# Patient Record
Sex: FEMALE | Race: WHITE | Employment: STUDENT | URBAN - METROPOLITAN AREA
[De-identification: names, ages, dates, MRNs, and addresses within clinical notes are randomized per-mention and may not be internally consistent; named-entity substitution may affect disease eponyms.]

---

## 2019-12-14 ENCOUNTER — OFFICE VISIT (OUTPATIENT)
Dept: URGENT CARE | Facility: URGENT CARE | Age: 19
End: 2019-12-14
Payer: COMMERCIAL

## 2019-12-14 VITALS
HEART RATE: 125 BPM | HEIGHT: 63 IN | TEMPERATURE: 99.2 F | DIASTOLIC BLOOD PRESSURE: 79 MMHG | SYSTOLIC BLOOD PRESSURE: 124 MMHG | OXYGEN SATURATION: 98 % | BODY MASS INDEX: 21.26 KG/M2 | WEIGHT: 120 LBS

## 2019-12-14 DIAGNOSIS — R52 ACHES: ICD-10-CM

## 2019-12-14 DIAGNOSIS — R07.0 THROAT PAIN: ICD-10-CM

## 2019-12-14 DIAGNOSIS — J02.0 STREPTOCOCCAL PHARYNGITIS: Primary | ICD-10-CM

## 2019-12-14 LAB
DEPRECATED S PYO AG THROAT QL EIA: ABNORMAL
FLUAV+FLUBV AG SPEC QL: NEGATIVE
FLUAV+FLUBV AG SPEC QL: NEGATIVE
SPECIMEN SOURCE: ABNORMAL
SPECIMEN SOURCE: NORMAL

## 2019-12-14 PROCEDURE — 87804 INFLUENZA ASSAY W/OPTIC: CPT | Mod: 59 | Performed by: PHYSICIAN ASSISTANT

## 2019-12-14 PROCEDURE — 99202 OFFICE O/P NEW SF 15 MIN: CPT | Performed by: PHYSICIAN ASSISTANT

## 2019-12-14 PROCEDURE — 87880 STREP A ASSAY W/OPTIC: CPT | Performed by: INTERNAL MEDICINE

## 2019-12-14 RX ORDER — AMOXICILLIN 500 MG/1
500 CAPSULE ORAL 3 TIMES DAILY
Qty: 21 CAPSULE | Refills: 0 | Status: SHIPPED | OUTPATIENT
Start: 2019-12-14 | End: 2019-12-21

## 2019-12-14 RX ORDER — IBUPROFEN 200 MG
200 TABLET ORAL EVERY 4 HOURS PRN
COMMUNITY

## 2019-12-14 RX ORDER — IBUPROFEN 800 MG/1
800 TABLET, FILM COATED ORAL EVERY 8 HOURS PRN
Qty: 30 TABLET | Refills: 0 | Status: SHIPPED | OUTPATIENT
Start: 2019-12-14

## 2019-12-14 ASSESSMENT — ENCOUNTER SYMPTOMS
SORE THROAT: 1
MUSCULOSKELETAL NEGATIVE: 1
COUGH: 1
CHILLS: 1
CARDIOVASCULAR NEGATIVE: 1
NAUSEA: 1
FEVER: 1
DIARRHEA: 0
EYES NEGATIVE: 1
VOMITING: 0
FATIGUE: 1

## 2019-12-14 ASSESSMENT — MIFFLIN-ST. JEOR: SCORE: 1283.45

## 2019-12-14 NOTE — PROGRESS NOTES
"SUBJECTIVE:   Andreina Johnson is a 19 year old female presenting with a chief complaint of   Chief Complaint   Patient presents with     Urgent Care     Pt in clinic to have eval for sore throat, cough, aches, and sweats.     Sweats     Generalized Body Aches     Pharyngitis       She is a new patient of [facility].  Patient presents with 3 days of fever, chills, body aches, ST.  Had influenza vaccine.      URI Adult    Onset of symptoms was 3 day(s) ago.  Course of illness is worsening.    Severity moderate  Current and Associated symptoms: fever, chills, sweats, stuffy nose, cough - non-productive and sore throat  Treatment measures tried include Tylenol/Ibuprofen.  Predisposing factors include student.        Review of Systems   Constitutional: Positive for chills, fatigue and fever.   HENT: Positive for congestion and sore throat.    Eyes: Negative.    Respiratory: Positive for cough.    Cardiovascular: Negative.    Gastrointestinal: Positive for nausea. Negative for diarrhea and vomiting.   Musculoskeletal: Negative.    Skin: Negative.    All other systems reviewed and are negative.      No past medical history on file.  No family history on file.  Current Outpatient Medications   Medication Sig Dispense Refill     amoxicillin (AMOXIL) 500 MG capsule Take 1 capsule (500 mg) by mouth 3 times daily for 7 days 21 capsule 0     ibuprofen (ADVIL/MOTRIN) 200 MG tablet Take 200 mg by mouth every 4 hours as needed for mild pain       ibuprofen (ADVIL/MOTRIN) 800 MG tablet Take 1 tablet (800 mg) by mouth every 8 hours as needed for moderate pain 30 tablet 0     ISOtretinoin (ACCUTANE PO)        Social History     Tobacco Use     Smoking status: Never Smoker     Smokeless tobacco: Never Used   Substance Use Topics     Alcohol use: Not on file       OBJECTIVE  /79   Pulse 125   Temp 99.2  F (37.3  C) (Oral)   Ht 1.6 m (5' 3\")   Wt 54.4 kg (120 lb)   SpO2 98%   BMI 21.26 kg/m      Physical Exam  Vitals " signs and nursing note reviewed.   Constitutional:       Appearance: Normal appearance. She is normal weight.   HENT:      Head: Normocephalic and atraumatic.      Right Ear: Tympanic membrane, ear canal and external ear normal.      Left Ear: Tympanic membrane, ear canal and external ear normal.      Nose: Nose normal.      Mouth/Throat:      Mouth: Mucous membranes are moist.      Pharynx: Oropharyngeal exudate and posterior oropharyngeal erythema present.   Eyes:      Extraocular Movements: Extraocular movements intact.      Conjunctiva/sclera: Conjunctivae normal.   Neck:      Musculoskeletal: Normal range of motion.   Cardiovascular:      Rate and Rhythm: Normal rate and regular rhythm.      Pulses: Normal pulses.      Heart sounds: Normal heart sounds.   Pulmonary:      Effort: Pulmonary effort is normal.      Breath sounds: Normal breath sounds.   Skin:     General: Skin is warm and dry.      Capillary Refill: Capillary refill takes less than 2 seconds.      Findings: No rash.   Neurological:      General: No focal deficit present.      Mental Status: She is alert and oriented to person, place, and time.   Psychiatric:         Mood and Affect: Mood normal.         Behavior: Behavior normal.         Labs:  Results for orders placed or performed in visit on 12/14/19 (from the past 24 hour(s))   Strep, Rapid Screen   Result Value Ref Range    Specimen Description Throat     Rapid Strep A Screen (A)      POSITIVE: Group A Streptococcal antigen detected by immunoassay.   Influenza A/B antigen   Result Value Ref Range    Influenza A/B Agn Specimen Nasal     Influenza A Negative NEG^Negative    Influenza B Negative NEG^Negative       X-Ray was not done.    ASSESSMENT:      ICD-10-CM    1. Streptococcal pharyngitis J02.0 amoxicillin (AMOXIL) 500 MG capsule     ibuprofen (ADVIL/MOTRIN) 800 MG tablet   2. Throat pain R07.0 Strep, Rapid Screen   3. Aches R52 Influenza A/B antigen        Medical Decision  Making:    Differential Diagnosis:  URI Adult/Peds:  Influenza, Strep pharyngitis, Viral pharyngitis, Viral syndrome and Viral upper respiratory illness    Serious Comorbid Conditions:  Adult:  None    PLAN:    URI Adult:  Tylenol, Ibuprofen, Fluids and Rest  Amoxicillin 500 TID x 7 d and Ibuprofen 800 TID prn.    Followup:    If not improving or if condition worsens, follow up with your Primary Care Provider, If not improving or if conditions worsens over the next 12-24 hours, go to the Emergency Department    Patient Instructions     Patient Education     Pharyngitis: Strep (Confirmed)    You have had a positive test for strep throat. Strep throat is a contagious illness. It is spread by coughing, kissing or by touching others after touching your mouth or nose. Symptoms include throat pain that is worse with swallowing, aching all over, headache, and fever. It is treated with antibiotic medicine. This should help you start to feel better in 1 to 2 days.  Home care    Rest at home. Drink plenty of fluids to you won't get dehydrated.    No work or school for the first 2 days of taking the antibiotics. After this time, you will not be contagious. You can then return to school or work if you are feeling better.     Take antibiotic medicine for the full 10 days, even if you feel better. This is very important to ensure the infection is treated. It is also important to prevent medicine-resistant germs from developing. If you were given an antibiotic shot, you don't need any more antibiotics.    You may use acetaminophen or ibuprofen to control pain or fever, unless another medicine was prescribed for this. Talk with your healthcare provider before taking these medicines if you have chronic liver or kidney disease. Also talk with your healthcare provider if you have had a stomach ulcer or GI bleeding.    Throat lozenges or sprays help reduce pain. Gargling with warm saltwater will also reduce throat pain. Dissolve 1/2  teaspoon of salt in 1 glass of warm water. This may be useful just before meals.     Soft foods are OK. Don't eat salty or spicy foods.  Follow-up care  Follow up with your healthcare provider or our staff if you don't get better over the next week.  When to seek medical advice  Call your healthcare provider right away if any of these occur:    Fever of 100.4 F (38 C) or higher, or as directed by your healthcare provider    New or worsening ear pain, sinus pain, or headache    Painful lumps in the back of neck    Stiff neck    Lymph nodes getting larger or becoming soft in the middle    You can't swallow liquids or you can't open your mouth wide because of throat pain    Signs of dehydration. These include very dark urine or no urine, sunken eyes, and dizziness.    Trouble breathing or noisy breathing    Muffled voice    Rash  Prevention  Here are steps you can take to help prevent an infection:    Keep good hand washing habits.    Don t have close contact with people who have sore throats, colds, or other upper respiratory infections.    Don t smoke, and stay away from secondhand smoke.  Date Last Reviewed: 11/1/2017 2000-2018 [employer]. [address] [place] [address], [place] [address]. All rights reserved. This information is not intended as a substitute for professional medical care. Always follow your healthcare professional's instructions.

## 2019-12-14 NOTE — PATIENT INSTRUCTIONS
Patient Education     Pharyngitis: Strep (Confirmed)    You have had a positive test for strep throat. Strep throat is a contagious illness. It is spread by coughing, kissing or by touching others after touching your mouth or nose. Symptoms include throat pain that is worse with swallowing, aching all over, headache, and fever. It is treated with antibiotic medicine. This should help you start to feel better in 1 to 2 days.  Home care    Rest at home. Drink plenty of fluids to you won't get dehydrated.    No work or school for the first 2 days of taking the antibiotics. After this time, you will not be contagious. You can then return to school or work if you are feeling better.     Take antibiotic medicine for the full 10 days, even if you feel better. This is very important to ensure the infection is treated. It is also important to prevent medicine-resistant germs from developing. If you were given an antibiotic shot, you don't need any more antibiotics.    You may use acetaminophen or ibuprofen to control pain or fever, unless another medicine was prescribed for this. Talk with your healthcare provider before taking these medicines if you have chronic liver or kidney disease. Also talk with your healthcare provider if you have had a stomach ulcer or GI bleeding.    Throat lozenges or sprays help reduce pain. Gargling with warm saltwater will also reduce throat pain. Dissolve 1/2 teaspoon of salt in 1 glass of warm water. This may be useful just before meals.     Soft foods are OK. Don't eat salty or spicy foods.  Follow-up care  Follow up with your healthcare provider or our staff if you don't get better over the next week.  When to seek medical advice  Call your healthcare provider right away if any of these occur:    Fever of 100.4 F (38 C) or higher, or as directed by your healthcare provider    New or worsening ear pain, sinus pain, or headache    Painful lumps in the back of neck    Stiff neck    Lymph  nodes getting larger or becoming soft in the middle    You can't swallow liquids or you can't open your mouth wide because of throat pain    Signs of dehydration. These include very dark urine or no urine, sunken eyes, and dizziness.    Trouble breathing or noisy breathing    Muffled voice    Rash  Prevention  Here are steps you can take to help prevent an infection:    Keep good hand washing habits.    Don t have close contact with people who have sore throats, colds, or other upper respiratory infections.    Don t smoke, and stay away from secondhand smoke.  Date Last Reviewed: 11/1/2017 2000-2018 [employer]. [address] [place] [address], [place] [address]. All rights reserved. This information is not intended as a substitute for professional medical care. Always follow your healthcare professional's instructions.